# Patient Record
Sex: MALE | Race: WHITE | HISPANIC OR LATINO | Employment: FULL TIME | URBAN - METROPOLITAN AREA
[De-identification: names, ages, dates, MRNs, and addresses within clinical notes are randomized per-mention and may not be internally consistent; named-entity substitution may affect disease eponyms.]

---

## 2017-03-01 ENCOUNTER — OFFICE VISIT (OUTPATIENT)
Dept: URGENT CARE | Facility: PHYSICIAN GROUP | Age: 27
End: 2017-03-01
Payer: COMMERCIAL

## 2017-03-01 VITALS
SYSTOLIC BLOOD PRESSURE: 128 MMHG | RESPIRATION RATE: 16 BRPM | BODY MASS INDEX: 32.2 KG/M2 | DIASTOLIC BLOOD PRESSURE: 84 MMHG | WEIGHT: 230 LBS | OXYGEN SATURATION: 94 % | TEMPERATURE: 98.2 F | HEART RATE: 82 BPM | HEIGHT: 71 IN

## 2017-03-01 DIAGNOSIS — J40 BRONCHITIS: ICD-10-CM

## 2017-03-01 DIAGNOSIS — R05.9 COUGH: ICD-10-CM

## 2017-03-01 PROCEDURE — 99203 OFFICE O/P NEW LOW 30 MIN: CPT | Performed by: NURSE PRACTITIONER

## 2017-03-01 RX ORDER — AZITHROMYCIN 250 MG/1
TABLET, FILM COATED ORAL
Qty: 6 TAB | Refills: 0 | Status: SHIPPED | OUTPATIENT
Start: 2017-03-01 | End: 2019-07-21

## 2017-03-01 RX ORDER — ALBUTEROL SULFATE 90 UG/1
2 AEROSOL, METERED RESPIRATORY (INHALATION) EVERY 6 HOURS PRN
Qty: 8.5 G | Refills: 1 | Status: SHIPPED | OUTPATIENT
Start: 2017-03-01 | End: 2019-07-21

## 2017-03-01 RX ORDER — METHYLPREDNISOLONE 4 MG/1
TABLET ORAL
Qty: 1 KIT | Refills: 0 | Status: SHIPPED | OUTPATIENT
Start: 2017-03-01 | End: 2019-07-21

## 2017-03-01 ASSESSMENT — ENCOUNTER SYMPTOMS
SHORTNESS OF BREATH: 1
COUGH: 1
WHEEZING: 1
CHILLS: 1
MYALGIAS: 1
HEADACHES: 1
FEVER: 0
SPUTUM PRODUCTION: 1

## 2017-03-02 NOTE — PROGRESS NOTES
"Subjective:      Jose Roberto Kruger is a 26 y.o. male who presents with Cough    PMH: no history of chronic illness    Social hx: non-smoker    Family hx: patient's wife has bronchitis     Allergies: Review of patient's allergies indicates no known allergies.    This patient is a 26-year-old male who presents today with complaint of chest tightness and congestion with sore throat and cough for the last 4 days. He has had mild sinus pain and pressure with this also. He denies fever, aches, or chills. No vomiting or diarrhea.          Cough  This is a new problem. The current episode started in the past 7 days. The problem has been gradually worsening. The problem occurs every few minutes. The cough is productive of sputum. Associated symptoms include chills, headaches, myalgias, nasal congestion, postnasal drip, shortness of breath and wheezing. Pertinent negatives include no fever. Associated symptoms comments: Chest tightness. Nothing aggravates the symptoms. The treatment provided no relief.       Review of Systems   Constitutional: Positive for chills and malaise/fatigue. Negative for fever.   HENT: Positive for congestion and postnasal drip.    Respiratory: Positive for cough, sputum production, shortness of breath and wheezing.    Musculoskeletal: Positive for myalgias.   Skin: Negative.    Neurological: Positive for headaches.       All other systems reviewed and are negative      Objective:     /84 mmHg  Pulse 82  Temp(Src) 36.8 °C (98.2 °F)  Resp 16  Ht 1.803 m (5' 11\")  Wt 104.327 kg (230 lb)  BMI 32.09 kg/m2  SpO2 94%     Physical Exam   Constitutional: He is oriented to person, place, and time. He appears well-developed and well-nourished.   HENT:   Head: Normocephalic.   Right Ear: External ear normal.   Left Ear: External ear normal.   Nose: Nose normal.   Mouth/Throat: Oropharynx is clear and moist. No oropharyngeal exudate.   Eyes: Conjunctivae and EOM are normal. Pupils are equal, round, and " reactive to light. Right eye exhibits no discharge. Left eye exhibits no discharge.   Neck: Normal range of motion. Neck supple.   Cardiovascular: Normal rate and regular rhythm.    Pulmonary/Chest: He has wheezes.   Musculoskeletal: Normal range of motion.   Neurological: He is alert and oriented to person, place, and time.   Skin: Skin is warm and dry.   Psychiatric: He has a normal mood and affect. His behavior is normal.   Vitals reviewed.              Assessment/Plan:   Bronchitis  Cough   -zithromax   -albuterol   -medrol dose niki if needed   -tylenol/motrin PRN   -follow up if symptoms persist or worsen   There are no diagnoses linked to this encounter.

## 2017-12-21 ENCOUNTER — HOSPITAL ENCOUNTER (EMERGENCY)
Facility: MEDICAL CENTER | Age: 27
End: 2017-12-21
Attending: EMERGENCY MEDICINE
Payer: COMMERCIAL

## 2017-12-21 VITALS
OXYGEN SATURATION: 96 % | SYSTOLIC BLOOD PRESSURE: 134 MMHG | DIASTOLIC BLOOD PRESSURE: 82 MMHG | HEIGHT: 71 IN | WEIGHT: 255.29 LBS | TEMPERATURE: 98.7 F | BODY MASS INDEX: 35.74 KG/M2 | HEART RATE: 115 BPM | RESPIRATION RATE: 18 BRPM

## 2017-12-21 DIAGNOSIS — M79.672 FOOT PAIN, LEFT: ICD-10-CM

## 2017-12-21 PROCEDURE — 99283 EMERGENCY DEPT VISIT LOW MDM: CPT

## 2017-12-21 RX ORDER — TRAMADOL HYDROCHLORIDE 50 MG/1
50 TABLET ORAL EVERY 6 HOURS PRN
Qty: 15 TAB | Refills: 0 | Status: SHIPPED | OUTPATIENT
Start: 2017-12-21 | End: 2017-12-26

## 2017-12-21 RX ORDER — LAMOTRIGINE 100 MG/1
150 TABLET ORAL DAILY
COMMUNITY

## 2017-12-22 NOTE — DISCHARGE INSTRUCTIONS
Use the prescription gel for your pain. As well as the crutches. Limit time on your feet for the next week. Follow-up as below, seek more immediate medical attention for any redness or excessive heat to your joint or foot, uncontrolled pain, fevers, or any other concerns      Foot Sprain  A foot sprain is an injury to one of the strong bands of tissue (ligaments) that connect and support the many bones in your feet. The ligament can be stretched too much or it can tear. A tear can be either partial or complete. The severity of the sprain depends on how much of the ligament was damaged or torn.  CAUSES  A foot sprain is usually caused by suddenly twisting or pivoting your foot.  RISK FACTORS  This injury is more likely to occur in people who:  · Play a sport, such as basketball or football.  · Exercise or play a sport without warming up.  · Start a new workout or sport.  · Suddenly increase how long or hard they exercise or play a sport.  SYMPTOMS  Symptoms of this condition start soon after an injury and include:  · Pain, especially in the arch of the foot.  · Bruising.  · Swelling.  · Inability to walk or use the foot to support body weight.  DIAGNOSIS  This condition is diagnosed with a medical history and physical exam. You may also have imaging tests, such as:  · X-rays to make sure there are no broken bones (fractures).  · MRI to see if the ligament has torn.  TREATMENT  Treatment varies depending on the severity of your sprain. Mild sprains can be treated with rest, ice, compression, and elevation (RICE). If your ligament is overstretched or partially torn, treatment usually involves keeping your foot in a fixed position (immobilization) for a period of time. To help you do this, your health care provider will apply a bandage, splint, or walking boot to keep your foot from moving until it heals. You may also be advised to use crutches or a scooter for a few weeks to avoid bearing weight on your foot while it  is healing.  If your ligament is fully torn, you may need surgery to reconnect the ligament to the bone. After surgery, a cast or splint will be applied and will need to stay on your foot while it heals.  Your health care provider may also suggest exercises or physical therapy to strengthen your foot.  HOME CARE INSTRUCTIONS  If You Have a Bandage, Splint, or Walking Boot:  · Wear it as directed by your health care provider. Remove it only as directed by your health care provider.  · Loosen the bandage, splint, or walking boot if your toes become numb and tingle, or if they turn cold and blue.  Bathing  · If your health care provider approves bathing and showering, cover the bandage or splint with a watertight plastic bag to protect it from water. Do not let the bandage or splint get wet.  Managing Pain, Stiffness, and Swelling   · If directed, apply ice to the injured area:  ¨ Put ice in a plastic bag.  ¨ Place a towel between your skin and the bag.  ¨ Leave the ice on for 20 minutes, 2-3 times per day.  · Move your toes often to avoid stiffness and to lessen swelling.  · Raise (elevate) the injured area above the level of your heart while you are sitting or lying down.  Driving  · Do not drive or operate heavy machinery while taking pain medicine.  · Do not drive while wearing a bandage, splint, or walking boot on a foot that you use for driving.  Activity  · Rest as directed by your health care provider.  · Do not use the injured foot to support your body weight until your health care provider says that you can. Use crutches or other supportive devices as directed by your health care provider.  · Ask your health care provider what activities are safe for you. Gradually increase how much and how far you walk until your health care provider says it is safe to return to full activity.  · Do any exercise or physical therapy as directed by your health care provider.  General Instructions  · If a splint was applied, do  not put pressure on any part of it until it is fully hardened. This may take several hours.  · Take medicines only as directed by your health care provider. These include over-the-counter medicines and prescription medicines.  · Keep all follow-up visits as directed by your health care provider. This is important.  · When you can walk without pain, wear supportive shoes that have stiff soles. Do not wear flip-flops, and do not walk barefoot.  SEEK MEDICAL CARE IF:  · Your pain is not controlled with medicine.  · Your bruising or swelling gets worse or does not get better with treatment.  · Your splint or walking boot is damaged.  SEEK IMMEDIATE MEDICAL CARE IF:  · Your foot is numb or blue.  · Your foot feels colder than normal.     This information is not intended to replace advice given to you by your health care provider. Make sure you discuss any questions you have with your health care provider.     Document Released: 06/09/2003 Document Revised: 05/03/2016 Document Reviewed: 10/21/2015  [employer] Patient Education ©2016 [employer]

## 2017-12-22 NOTE — ED PROVIDER NOTES
ED Provider Note    ER PROVIDER NOTE        CHIEF COMPLAINT  Chief Complaint   Patient presents with   • Ankle Pain     x's day        HPI  Jose Roberto Kruger is a 27 y.o. male who presents to the emergency department complaining of Left-sided foot pain. Patient reports that he did sit funny on his foot yesterday and the pain has been progressively worsening, he went to his podiatrist today received a cortisone injection and pain has been worse since then. He did receive an x-ray there as well and demonstrated no injury. Patient denies any fevers. He denies any redness or increased warmth. He denies any focal weakness numbness or tingling. States it is mainly painful when he walks on it    REVIEW OF SYSTEMS  Pertinent positives include foot pain. Pertinent negatives include no fever. See HPI for details. All other systems reviewed and are negative.    PAST MEDICAL HISTORY       SOCIAL HISTORY  Social History   Substance Use Topics   • Smoking status: Never Smoker   • Smokeless tobacco: Not on file   • Alcohol use Yes      Comment: occ       SURGICAL HISTORY   has a past surgical history that includes appendectomy laparoscopic (6/19/2011).    CURRENT MEDICATIONS  Home Medications     Reviewed by Rani Suarez R.N. (Registered Nurse) on 12/21/17 at 2255  Med List Status: Complete   Medication Last Dose Status   albuterol 108 (90 BASE) MCG/ACT Aero Soln inhalation aerosol not taking Active   amoxicillin-clavulanate (AUGMENTIN) 875-125 MG TABS not taking Active   azithromycin (ZITHROMAX) 250 MG Tab not taking Active   lamotrigine (LAMICTAL) 100 MG Tab 12/21/2017 Active   MethylPREDNISolone (MEDROL DOSEPAK) 4 MG Tablet Therapy Pack 12/21/2017 Active   metronidazole (FLAGYL) 500 MG TABS not taking Active   ondansetron (ZOFRAN) 4 MG TABS not taking Active   oxycodone-acetaminophen (PERCOCET) 7.5-325 MG per tablet not taking Active                ALLERGIES  No Known Allergies    PHYSICAL EXAM  VITAL SIGNS: /82    "Pulse (!) 115   Temp 37.1 °C (98.7 °F)   Resp 18   Ht 1.803 m (5' 11\")   Wt 115.8 kg (255 lb 4.7 oz)   SpO2 96%   BMI 35.61 kg/m²   Pulse ox interpretation: I interpret this pulse ox as normal.    Constitutional: Alert.  In no apparent distress.  HENT: Normocephalic, Atraumatic, Bilateral external ears normal. Nose normal.   Eyes: Pupils are equal and reactive. Conjunctiva normal, non-icteric.   Heart: Regular rate and rhythm, no murmurs.    Lungs: Clear to auscultation bilaterally.  Skin: Warm, Dry, No erythema, No rash.   Musculoskeletal:Mild swelling over dorsum of foot, more swelling over medial aspect of mid foot, no erythema, no excessive warmth, tenderness over the swollen area. Patient has no pain with passive range of motion of ankle although he does have some pain when actively ranging his foot, distal capillary refill less than 2 seconds, distal sensation intact to light touch No other tenderness or major deformities noted. No edema.  Neurologic: Alert, Grossly non-focal.   Psychiatric: Affect normal, Judgment normal, Mood normal, Appears appropriate and not intoxicated.     DIAGNOSTIC STUDIES / PROCEDURES        COURSE & MEDICAL DECISION MAKING  Nursing notes, VS, PMSFHx reviewed in chart.    10:55 PM - Patient seen and examined at bedside.   Decision Making:  This is a 27 y.o. male presented with foot pain. This does seem to be more soft tissue in nature although no clear obvious traumatic mechanism other than twisting while sitting.  He did have prior x-ray with no evidence of fracture or dislocation.  He has no erythema, excessive warmth or fevers suggestive of infectious process, no pain when range of actual ankle to suggest a septic arthritis or inflammatory arthritis in the joint itself.  Will prescribe diclofenac gel, ultram, advised staying off his foot, crutches, follow-up with orthopedics    I reviewed prescription monitoring program for patient's narcotic use before prescribing a " scheduled drug.The patient will not drink alcohol nor drive with prescribed medications.The patient will return for new or worsening symptoms and is stable at the time of discharge.    The patient is referred to a primary physician for blood pressure management, diabetic screening, and for all other preventative health concerns.    DISPOSITION:  Patient will be discharged home in stable condition.    FOLLOW UP:  Micky Drew M.D.  [address]  [address]  [facility] [id number] [phone]    In 2 weeks  As needed      OUTPATIENT MEDICATIONS:  Discharge Medication List as of 12/21/2017 11:25 PM      START taking these medications    Details   Diclofenac Sodium 1 % Gel Apply 1 Application to skin as directed 2 Times a Day for 7 days., Disp-1 Tube, R-0, Print Rx Paper               FINAL IMPRESSION  1. Foot pain, left        The note accurately reflects work and decisions made by me.  Kalen Lerner  12/22/2017  12:04 AM

## 2017-12-22 NOTE — ED NOTES
Patient to ED triage with complaints of right foot pain. Start last night after moving around house. Pain is to top of foot. Saw MD today. He gave pt a cortisone injection and an xray. Did not show any fracture. But patient continues to have pain and difficulty with putting weight on foot. No significant swelling or bruising.

## 2019-07-21 ENCOUNTER — HOSPITAL ENCOUNTER (EMERGENCY)
Facility: MEDICAL CENTER | Age: 29
End: 2019-07-21
Attending: EMERGENCY MEDICINE
Payer: COMMERCIAL

## 2019-07-21 ENCOUNTER — APPOINTMENT (OUTPATIENT)
Dept: RADIOLOGY | Facility: MEDICAL CENTER | Age: 29
End: 2019-07-21
Attending: EMERGENCY MEDICINE
Payer: COMMERCIAL

## 2019-07-21 ENCOUNTER — OFFICE VISIT (OUTPATIENT)
Dept: URGENT CARE | Facility: PHYSICIAN GROUP | Age: 29
End: 2019-07-21
Payer: COMMERCIAL

## 2019-07-21 VITALS
SYSTOLIC BLOOD PRESSURE: 119 MMHG | DIASTOLIC BLOOD PRESSURE: 70 MMHG | HEART RATE: 69 BPM | TEMPERATURE: 97.8 F | WEIGHT: 214.95 LBS | HEIGHT: 71 IN | BODY MASS INDEX: 30.09 KG/M2 | OXYGEN SATURATION: 95 % | RESPIRATION RATE: 16 BRPM

## 2019-07-21 VITALS
HEART RATE: 65 BPM | SYSTOLIC BLOOD PRESSURE: 112 MMHG | DIASTOLIC BLOOD PRESSURE: 80 MMHG | WEIGHT: 213 LBS | HEIGHT: 71 IN | BODY MASS INDEX: 29.82 KG/M2 | TEMPERATURE: 98.3 F | OXYGEN SATURATION: 98 %

## 2019-07-21 DIAGNOSIS — R51.9 ACUTE NONINTRACTABLE HEADACHE, UNSPECIFIED HEADACHE TYPE: ICD-10-CM

## 2019-07-21 DIAGNOSIS — R51.9 ACUTE INTRACTABLE HEADACHE, UNSPECIFIED HEADACHE TYPE: ICD-10-CM

## 2019-07-21 DIAGNOSIS — G43.109 OPHTHALMIC MIGRAINE: ICD-10-CM

## 2019-07-21 DIAGNOSIS — H53.9: ICD-10-CM

## 2019-07-21 PROCEDURE — 99284 EMERGENCY DEPT VISIT MOD MDM: CPT

## 2019-07-21 PROCEDURE — 700102 HCHG RX REV CODE 250 W/ 637 OVERRIDE(OP)

## 2019-07-21 PROCEDURE — 99214 OFFICE O/P EST MOD 30 MIN: CPT | Performed by: PHYSICIAN ASSISTANT

## 2019-07-21 PROCEDURE — A9270 NON-COVERED ITEM OR SERVICE: HCPCS

## 2019-07-21 PROCEDURE — 70450 CT HEAD/BRAIN W/O DYE: CPT

## 2019-07-21 RX ORDER — OMEGA-3/DHA/EPA/FISH OIL 60 MG-90MG
CAPSULE ORAL
COMMUNITY

## 2019-07-21 RX ADMIN — ACETAMINOPHEN, ASPIRIN AND CAFFEINE 1 TABLET: 250; 250; 65 TABLET, FILM COATED ORAL at 19:33

## 2019-07-21 ASSESSMENT — ENCOUNTER SYMPTOMS
FOCAL WEAKNESS: 0
EYE REDNESS: 0
NAUSEA: 1
DIZZINESS: 0
FEVER: 0
TINGLING: 0
EYE DISCHARGE: 0
EYE PAIN: 0
LOSS OF CONSCIOUSNESS: 0
HEADACHES: 1
SENSORY CHANGE: 0
CHILLS: 0

## 2019-07-21 NOTE — PROGRESS NOTES
"Subjective:   Jose Roberto Kruger is a 29 y.o. male who presents for Loss of Vision (last night for an hour, HA since last night)    This is a new problem.  Patient presents to urgent care with sudden onset last evening of complete loss of vision that lasted for several hours and eventually resolved.  He states that he had \"flash blindness\".  He states that he had vision that looked as if he had been blinded by a light and eventually lost all vision for approximately an hour which slowly improved over time.  He has since had a frontal headache.  He states that he does have a history of migraine however this type headache is completely different than his typical migraine.  He did have some mild associated nausea with the headache yesterday.  Patient reports the headache as a dull ache located between the eyes in the frontal region.  He denies any further blurred vision.  Patient denies any recent head injury or loss of consciousness.  Patient has taken Excedrin and ibuprofen with no real improvement in symptoms.      Loss of Vision   Associated symptoms include headaches and nausea. Pertinent negatives include no chills or fever.     Review of Systems   Constitutional: Negative for chills, fever and malaise/fatigue.   Eyes: Negative for pain, discharge and redness.        Complete loss of vision last evening   Gastrointestinal: Positive for nausea.   Neurological: Positive for headaches. Negative for dizziness, tingling, sensory change, focal weakness and loss of consciousness.   All other systems reviewed and are negative.    No Known Allergies     Objective:   /80   Pulse 65   Temp 36.8 °C (98.3 °F) (Temporal)   Ht 1.803 m (5' 11\")   Wt 96.6 kg (213 lb)   SpO2 98%   BMI 29.71 kg/m²   Physical Exam   Constitutional: He is oriented to person, place, and time. He appears well-developed and well-nourished.   HENT:   Head: Normocephalic and atraumatic.   Right Ear: Tympanic membrane, external ear and ear canal " normal.   Left Ear: Tympanic membrane, external ear and ear canal normal.   Nose: Nose normal.   Mouth/Throat: Uvula is midline, oropharynx is clear and moist and mucous membranes are normal. No oropharyngeal exudate.   Eyes: Pupils are equal, round, and reactive to light. Conjunctivae and EOM are normal.   Neck: Normal range of motion. Neck supple.   Cardiovascular: Normal rate, regular rhythm and normal heart sounds.  Exam reveals no friction rub.    No murmur heard.  Pulmonary/Chest: Effort normal and breath sounds normal. No respiratory distress.   Abdominal: Soft. Bowel sounds are normal. There is no hepatosplenomegaly. There is no tenderness.   Musculoskeletal: Normal range of motion.   Lymphadenopathy:        Head (right side): No submental, no submandibular and no tonsillar adenopathy present.        Head (left side): No submental, no submandibular and no tonsillar adenopathy present.     He has no cervical adenopathy.        Right: No supraclavicular adenopathy present.        Left: No supraclavicular adenopathy present.   Neurological: He is alert and oriented to person, place, and time. He has normal strength. No cranial nerve deficit or sensory deficit. Coordination normal.   Reflex Scores:       Bicep reflexes are 2+ on the right side and 2+ on the left side.       Brachioradialis reflexes are 2+ on the right side and 2+ on the left side.       Patellar reflexes are 2+ on the right side and 2+ on the left side.       Achilles reflexes are 2+ on the right side and 2+ on the left side.  No pronator drift  Finger to nose: Normal  Normal rapid alternating movements without dysdiadochokinesis     Skin: Skin is warm and dry. No rash noted.   Psychiatric: He has a normal mood and affect. Judgment normal.   Vitals reviewed.          Assessment/Plan:   Assessment    1. Temporary visual disturbance    2. Acute intractable headache, unspecified headache type    I suspect the patient will ultimately turn out to  have been experiencing ocular migraine.  However, given the complete loss of vision which is completely different than his typical migraine as well as continued unusual headache that is not like his typical headache I believe he warrants further evaluation at a higher level of care and have encouraged him to present to the emergency room for further evaluation.  The patient and his family are agreeable and wished to proceed.    Differential diagnosis, natural history, supportive care, and indications for immediate follow-up discussed.     The patient demonstrated a good understanding and agreed with the treatment plan.    Please note that this note was created using voice recognition speech to text software. Every effort has been made to correct obvious errors.  However, I expect there are errors of grammar and possibly context that were not discovered prior to finalizing the note  MIGUEL ANGEL Hernandes PA-C

## 2019-07-22 NOTE — ED PROVIDER NOTES
"ED Provider Note    ED Provider Note    Scribed for Ольга Turner MD by Ольга Turner. 7/21/2019, 6:43 PM.    Primary care provider: Vasyl Omalley M.D.  Means of arrival: Private  History obtained from: Patient and SO  History limited by: None    CHIEF COMPLAINT  Chief Complaint   Patient presents with   • Headache     started about 10:30 pm    started just after \"flash blindness\"  no  Hx of such   • Loss of Vision     started last night  was sent from  for further evaluation        HPI  Jose Roberto Kruger is a 29 y.o. male who presents to the Emergency Department for evaluation of headache with visual disturbance.  Patient does have a history of migraines and will occasionally have a visual disturbance.  He notes however last night he noted nausea, bilateral mild retro-orbital headache, dull, and began to have a \"opaque\" appearance to the left visual field in both eyes at the lower aspect.  This lasted about an hour to an hour and a half last night.  He notes a gradually worsening headache since, still dull and localized to the retro-orbital region.  Nausea, no clear alleviating or exacerbating factors.  Patient has no history of ocular disease, he follows up with an optometrist given he wears glasses and is currently wearing contacts.  No eye drainage or eye pain or redness.    REVIEW OF SYSTEMS  Pertinent positives include headache, visual disturbance, nausea. Pertinent negatives include no vision loss, no acute visual disturbance or blurred vision, no ocular or head trauma, no fever, no vomiting, no focal weakness or numbness..  All other systems reviewed and negative.    PAST MEDICAL HISTORY   History of migraines    SURGICAL HISTORY   has a past surgical history that includes appendectomy laparoscopic (6/19/2011).    SOCIAL HISTORY  Social History   Substance Use Topics   • Smoking status: Never Smoker   • Smokeless tobacco: Never Used   • Alcohol use Yes      Comment: occ      History   Drug " "Use No       FAMILY HISTORY  No family history on file.  Family history of diabetes and hypertension    CURRENT MEDICATIONS  Home Medications     Reviewed by Nan Quinonez R.N. (Registered Nurse) on 07/21/19 at 1801  Med List Status: Partial   Medication Last Dose Status   lamotrigine (LAMICTAL) 100 MG Tab 7/21/2019 Active   MULTIPLE VITAMIN PO 7/21/2019 Active   Omega-3 Fatty Acids (FISH OIL) 500 MG Cap 7/21/2019 Active                ALLERGIES  No Known Allergies    PHYSICAL EXAM  VITAL SIGNS: /70   Pulse 69   Temp 36.6 °C (97.8 °F) (Temporal)   Resp 16   Ht 1.803 m (5' 11\")   Wt 97.5 kg (214 lb 15.2 oz)   SpO2 95%   BMI 29.98 kg/m²     General: Alert, no acute distress  Skin: Warm, dry, normal for ethnicity  Head: Normocephalic, atraumatic  Neck: Trachea midline, no tenderness  Eye: PERRL, normal conjunctiva, extraocular movements intact without nystagmus.  With the ophthalmoscope the right is unremarkable in appearance, there is no papilla edema.  Visual acuity charted by nursing.  ENMT: Oral mucosa moist, no pharyngeal erythema or exudate  Cardiovascular: Regular rate and rhythm, No murmur, Normal peripheral perfusion  Respiratory: Lungs CTA, respirations are non-labored, breath sounds are equal  Musculoskeletal: No swelling, no deformity  Neurological: Alert and oriented to person, place, time, and situation.  Cranial nerves II through XII are grossly intact, upper and lower extremity strength and sensation are 5 x 5 and symmetrical bilaterally, 2+ symmetrical patellar reflexes, normal gait without ataxia.  No pronator drift.  Lymphatics: No lymphadenopathy  Psychiatric: Cooperative, appropriate mood & affect          RADIOLOGY  CT-HEAD W/O   Final Result      No acute intracranial abnormality.        The radiologist's interpretation of all radiological studies have been reviewed by me.    COURSE & MEDICAL DECISION MAKING  Pertinent Labs & Imaging studies reviewed. (See chart for " "details)    6:43 PM - Patient seen and examined at bedside. Patient will be treated with Excedrin as he declines IV placement. Ordered CT imaging of the brain to evaluate his symptoms. The differential diagnoses include but are not limited to: Ocular migraine, tension headache, migraine aura    1930: Patient reassessed, feeling well, relieved here of unremarkable imaging.    Patient Vitals for the past 24 hrs:   BP Temp Temp src Pulse Resp SpO2 Height Weight   07/21/19 1940 119/70 36.6 °C (97.8 °F) Temporal 69 16 95 % - -   07/21/19 1757 128/83 36.4 °C (97.5 °F) Temporal 72 16 98 % - -   07/21/19 1752 - - - - - - 1.803 m (5' 11\") 97.5 kg (214 lb 15.2 oz)       Decision Making:  This is a 29 y.o. year old male who presents with transient visual disturbance and worsening headache.  Ocular exam is unremarkable, visual acuity is unremarkable, given he describes an \"opaque\" appearance to the left visual field rather than simply the left I suspect likely this is neurologic in etiology.  Suspect likely ocular migraine, however given he does not typically have headaches like this I do think neuroimaging is indicated.  No indication for lumbar puncture or CT angiogram given timing of symptoms, will obtain CT noncontrast of the brain.  Thankfully this is unremarkable.  Given visual field deficits resolved, given no CT evidence of hemorrhage or ischemia, patient otherwise well-appearing and nontoxic unremarkable neurologic exam there is no indication for inpatient management no emergent neurosurgical intervention.  Suspect likely ocular migraine; no evidence on exam of eyes of ophthalmologic pathology especially considering bilateral symptoms.    The patient will return for new or worsening symptoms and is stable at the time of discharge.    Patient has had high blood pressure while in the emergency department, felt likely secondary to medical condition. Counseled patient to monitor blood pressure at home and follow up with " primary care physician.     DISPOSITION:  Patient will be discharged home in stable condition.    FOLLOW UP:  Vasyl Omalley M.D.  [address] [facility] [address]  [address]  [place] [address]  [phone]    Schedule an appointment as soon as possible for a visit in 2 days        OUTPATIENT MEDICATIONS:  Discharge Medication List as of 7/21/2019  7:43 PM            FINAL IMPRESSION  1. Ophthalmic migraine    2. Acute nonintractable headache, unspecified headache type          I, Ольга Turner (Vazquez), am scribing for, and in the presence of, Ольга Turner MD.    Electronically signed by: Ольга Turner (Vazquez), 7/21/2019    IОльга MD personally performed the services described in this documentation, as scribed by Ольга Turner in my presence, and it is both accurate and complete    The note accurately reflects work and decisions made by me.  Ольга Turner  7/22/2019  12:51 AM

## 2019-07-22 NOTE — ED TRIAGE NOTES
"Pt was sent here from   C/o headache that started last night   Started right after a \"flash sudden onset of partial blindness\"   Most has recovered but still having visual disturbance    "

## 2019-07-22 NOTE — ED NOTES
Pt discharged in good condition with follow up instructions, will return to ER for worsening symptoms, verbalizes understanding of all, ambulates out with SO.

## 2020-09-23 ENCOUNTER — HOSPITAL ENCOUNTER (OUTPATIENT)
Facility: MEDICAL CENTER | Age: 30
End: 2020-09-23
Attending: FAMILY MEDICINE
Payer: COMMERCIAL

## 2020-09-23 ENCOUNTER — OFFICE VISIT (OUTPATIENT)
Dept: URGENT CARE | Facility: PHYSICIAN GROUP | Age: 30
End: 2020-09-23
Payer: COMMERCIAL

## 2020-09-23 VITALS
OXYGEN SATURATION: 96 % | BODY MASS INDEX: 31.22 KG/M2 | DIASTOLIC BLOOD PRESSURE: 60 MMHG | HEIGHT: 71 IN | TEMPERATURE: 97.6 F | HEART RATE: 90 BPM | RESPIRATION RATE: 16 BRPM | SYSTOLIC BLOOD PRESSURE: 110 MMHG | WEIGHT: 223 LBS

## 2020-09-23 DIAGNOSIS — J06.9 UPPER RESPIRATORY TRACT INFECTION, UNSPECIFIED TYPE: ICD-10-CM

## 2020-09-23 LAB
FLUAV+FLUBV AG SPEC QL IA: NEGATIVE
INT CON NEG: NEGATIVE
INT CON POS: POSITIVE

## 2020-09-23 PROCEDURE — U0003 INFECTIOUS AGENT DETECTION BY NUCLEIC ACID (DNA OR RNA); SEVERE ACUTE RESPIRATORY SYNDROME CORONAVIRUS 2 (SARS-COV-2) (CORONAVIRUS DISEASE [COVID-19]), AMPLIFIED PROBE TECHNIQUE, MAKING USE OF HIGH THROUGHPUT TECHNOLOGIES AS DESCRIBED BY CMS-2020-01-R: HCPCS

## 2020-09-23 PROCEDURE — 99214 OFFICE O/P EST MOD 30 MIN: CPT | Performed by: FAMILY MEDICINE

## 2020-09-23 PROCEDURE — 87804 INFLUENZA ASSAY W/OPTIC: CPT | Performed by: FAMILY MEDICINE

## 2020-09-23 RX ORDER — LITHIUM CARBONATE 300 MG
300 TABLET ORAL 3 TIMES DAILY
COMMUNITY

## 2020-09-23 NOTE — PROGRESS NOTES
CC:  cough        Cough  This is a new problem. The current episode started 2 days ago. The problem has been unchanged. The problem occurs constantly. The cough is dry. Associated symptoms include : dizziness,  fatigue, muscle aches, but denies fever. Pertinent negatives include no   headaches, nausea, vomiting, diarrhea, sweats, weight loss or wheezing. Nothing aggravates the symptoms.  Patient has tried nothing for the symptoms. There is no history of asthma.      + exposure to COVID         Past medical history was unremarkable and not pertinent to current issue      Social History     Tobacco Use   • Smoking status: Never Smoker   • Smokeless tobacco: Never Used   Substance Use Topics   • Alcohol use: Yes     Comment: occ   • Drug use: No         Current Outpatient Medications on File Prior to Visit   Medication Sig Dispense Refill   • lithium (ESKALITH) 300 MG Tab Take 300 mg by mouth 3 times a day.     • MULTIPLE VITAMIN PO Take  by mouth.     • lamotrigine (LAMICTAL) 100 MG Tab Take 150 mg by mouth every day.     • Omega-3 Fatty Acids (FISH OIL) 500 MG Cap Take  by mouth.       No current facility-administered medications on file prior to visit.                 Review of Systems   Constitutional: Negative for fever .   +  malaise/fatigue.   Eyes: Negative for vision changes, d/c.    Respiratory: + for cough .   Denies sputum production.    Cardiovascular: Negative for chest pain and palpitations.   Gastrointestinal: Negative for nausea, vomiting, abdominal pain, diarrhea and constipation.   Genitourinary: Negative for dysuria, urgency and frequency.   Skin: Negative for rash or  itching.   Neurological: + for dizziness and HA.    Psychiatric/Behavioral: Negative for depression.   Hematologic/lymphatic - denies bruising or excessive bleeding  All other systems reviewed and are negative.;l     Objective:     /60 (BP Location: Left arm, Patient Position: Sitting, BP Cuff Size: Adult)   Pulse 90   Temp  "36.4 °C (97.6 °F) (Temporal)   Resp 16   Ht 1.803 m (5' 11\")   Wt 101.2 kg (223 lb)   SpO2 96%     Physical Exam   Constitutional: patient is oriented to person, place, and time. Patient appears well-developed and well-nourished. No distress.   HENT:   Head: Normocephalic and atraumatic.   Right Ear: External ear normal.   Left Ear: External ear normal.   Nose: Mucosal edema  present. Right sinus exhibits no maxillary sinus tenderness. Left sinus exhibits no maxillary sinus tenderness.   Mouth/Throat: Mucous membranes are normal. No oral lesions.  No posterior pharyngeal erythema.  No oropharyngeal exudate or posterior oropharyngeal edema.   Eyes: Conjunctivae and EOM are normal. Pupils are equal, round, and reactive to light. Right eye exhibits no discharge. Left eye exhibits no discharge. No scleral icterus.   Neck: Normal range of motion. Neck supple. No tracheal deviation present.   Cardiovascular: Normal rate, regular rhythm and normal heart sounds.  Exam reveals no friction rub.    Pulmonary/Chest: Effort normal. No respiratory distress. Patient has no wheezes or rhonchi. Patient has no rales.    Musculoskeletal:  exhibits no edema.   Lymphadenopathy:     Patient has no cervical adenopathy.      Neurological: patient is alert and oriented to person, place, and time.   Skin: Skin is warm and dry. No rash noted. No erythema.   Psychiatric: patient  has a normal mood and affect.  behavior is normal.   Nursing note and vitals reviewed.              Assessment/Plan:       1. Upper respiratory tract infection, unspecified type   rapid strep, influenza negative.   COVID screen sent  Home isolation for now  Will call with results.     Follow up in one week if no improvement, sooner if symptoms worsen.      rx motrin 800mg tid prn        Follow up in one week if no improvement    "

## 2020-09-24 LAB
COVID ORDER STATUS COVID19: NORMAL
SARS-COV-2 RNA RESP QL NAA+PROBE: NOTDETECTED
SPECIMEN SOURCE: NORMAL

## 2020-12-04 ENCOUNTER — HOSPITAL ENCOUNTER (OUTPATIENT)
Dept: LAB | Facility: MEDICAL CENTER | Age: 30
End: 2020-12-04
Attending: STUDENT IN AN ORGANIZED HEALTH CARE EDUCATION/TRAINING PROGRAM
Payer: COMMERCIAL

## 2020-12-04 PROCEDURE — C9803 HOPD COVID-19 SPEC COLLECT: HCPCS

## 2020-12-04 PROCEDURE — U0003 INFECTIOUS AGENT DETECTION BY NUCLEIC ACID (DNA OR RNA); SEVERE ACUTE RESPIRATORY SYNDROME CORONAVIRUS 2 (SARS-COV-2) (CORONAVIRUS DISEASE [COVID-19]), AMPLIFIED PROBE TECHNIQUE, MAKING USE OF HIGH THROUGHPUT TECHNOLOGIES AS DESCRIBED BY CMS-2020-01-R: HCPCS

## 2020-12-05 LAB — COVID ORDER STATUS COVID19: NORMAL

## 2020-12-06 LAB
SARS-COV-2 RNA RESP QL NAA+PROBE: NOTDETECTED
SPECIMEN SOURCE: NORMAL

## 2024-12-16 ENCOUNTER — APPOINTMENT (OUTPATIENT)
Dept: URGENT CARE | Facility: PHYSICIAN GROUP | Age: 34
End: 2024-12-16
Payer: COMMERCIAL